# Patient Record
Sex: MALE | URBAN - METROPOLITAN AREA
[De-identification: names, ages, dates, MRNs, and addresses within clinical notes are randomized per-mention and may not be internally consistent; named-entity substitution may affect disease eponyms.]

---

## 2017-07-10 ENCOUNTER — EMERGENCY (EMERGENCY)
Facility: HOSPITAL | Age: 47
LOS: 0 days | Discharge: HOME | End: 2017-07-10
Attending: EMERGENCY MEDICINE
Payer: COMMERCIAL

## 2017-07-10 VITALS
DIASTOLIC BLOOD PRESSURE: 103 MMHG | HEART RATE: 87 BPM | HEIGHT: 71 IN | SYSTOLIC BLOOD PRESSURE: 192 MMHG | TEMPERATURE: 98 F | OXYGEN SATURATION: 100 % | WEIGHT: 179.9 LBS | RESPIRATION RATE: 18 BRPM

## 2017-07-10 PROCEDURE — 70450 CT HEAD/BRAIN W/O DYE: CPT | Mod: 26

## 2017-07-10 PROCEDURE — 99284 EMERGENCY DEPT VISIT MOD MDM: CPT | Mod: 25

## 2017-07-10 PROCEDURE — 12013 RPR F/E/E/N/L/M 2.6-5.0 CM: CPT

## 2017-07-10 PROCEDURE — 72125 CT NECK SPINE W/O DYE: CPT | Mod: 26

## 2017-07-10 PROCEDURE — 76377 3D RENDER W/INTRP POSTPROCES: CPT | Mod: 26

## 2017-07-10 RX ORDER — TETANUS TOXOID, REDUCED DIPHTHERIA TOXOID AND ACELLULAR PERTUSSIS VACCINE, ADSORBED 5; 2.5; 8; 8; 2.5 [IU]/.5ML; [IU]/.5ML; UG/.5ML; UG/.5ML; UG/.5ML
0.5 SUSPENSION INTRAMUSCULAR ONCE
Qty: 0 | Refills: 0 | Status: COMPLETED | OUTPATIENT
Start: 2017-07-10 | End: 2017-07-10

## 2017-07-10 RX ADMIN — TETANUS TOXOID, REDUCED DIPHTHERIA TOXOID AND ACELLULAR PERTUSSIS VACCINE, ADSORBED 0.5 MILLILITER(S): 5; 2.5; 8; 8; 2.5 SUSPENSION INTRAMUSCULAR at 20:05

## 2017-07-10 NOTE — ED PROVIDER NOTE - PHYSICAL EXAMINATION
Gen: Alert, NAD  Head: NC, 4cm laceration right forehead, hemostatic  Eyes: PERRL, EOMI, normal lids/conjunctiva  ENT: normal hearing, patent oropharynx without erythema/exudate, uvula midline  Neck: supple, no tenderness, Trachea midline  Pulm: Bilateral BS, normal resp effort, no wheeze/stridor/retractions  CV: RRR, no M/R/G, 2+ radial and dp pulses bl, no edema  Abd: soft, NT/ND, +BS, no hepatosplenomegaly  Mskel: extremities x4 with normal ROM and no joint effusions. no ctl spine ttp.   Skin: 3 cm vertical laceration right forehead, deep to calvarium  Neuro: AAOx3, no sensory/motor deficits, CN 2-12 intact

## 2017-07-10 NOTE — ED ADULT NURSE NOTE - OBJECTIVE STATEMENT
pt came in with c/o s/p fall from a ladder , with head injury , denies any loc,sustained laceration to right lateral forehead,ct scan done,

## 2017-07-10 NOTE — ED ADULT TRIAGE NOTE - CHIEF COMPLAINT QUOTE
Right sided abdominal pain.  Vomiting since this AM.  no c/o chest pain, dizziness or weakness. S/p fall from the ladder 7 feet high. no Blood thinner. Laceration o the head. Denies any loc

## 2017-07-10 NOTE — ED PROVIDER NOTE - OBJECTIVE STATEMENT
Pertinent PMH/PSH/FHx/SHx and Review of Systems contained within:  47m no med hx pw fall off ladder mechanically. fell approx 6ft, landing on hands, head hit brick. no loc, no ha. took ibuprofen pta. laceration of forehead bleeding a lot. no nasuea, vomting, dizziness, blurred vision   Fh and Sh not otherwise contributory  ROS otherwise negative

## 2017-07-10 NOTE — ED PROVIDER NOTE - MEDICAL DECISION MAKING DETAILS
fall from ladder. laceration to forehead. sutured with good cosmetic and hemostatic effect. tetanus given. ct head and c spine negative for acute process. humberto.

## 2017-07-11 DIAGNOSIS — S01.81XA LACERATION WITHOUT FOREIGN BODY OF OTHER PART OF HEAD, INITIAL ENCOUNTER: ICD-10-CM
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
## 2017-07-17 ENCOUNTER — EMERGENCY (EMERGENCY)
Facility: HOSPITAL | Age: 47
LOS: 0 days | Discharge: HOME | End: 2017-07-17
Attending: EMERGENCY MEDICINE

## 2017-07-17 VITALS
SYSTOLIC BLOOD PRESSURE: 113 MMHG | DIASTOLIC BLOOD PRESSURE: 66 MMHG | HEART RATE: 74 BPM | WEIGHT: 190.04 LBS | HEIGHT: 71 IN | OXYGEN SATURATION: 99 % | TEMPERATURE: 98 F | RESPIRATION RATE: 18 BRPM

## 2017-07-17 NOTE — ED PROVIDER NOTE - ATTENDING CONTRIBUTION TO CARE
H&P by me: 47 year old male request suture removal. H&P by me: 47 year old male request suture removal. PE: right forehead sutures c/d/i. I&P: sutures removal.

## 2017-07-17 NOTE — ED PROVIDER NOTE - OBJECTIVE STATEMENT
47M here for suture removal, placed to right forehead x 1 week ago, no pain or drainage. No redness or warmth. No fever, chills, nausea, vomiting.

## 2017-07-18 DIAGNOSIS — Z48.02 ENCOUNTER FOR REMOVAL OF SUTURES: ICD-10-CM

## 2021-03-02 NOTE — ED ADULT NURSE NOTE - DOES PATIENT HAVE ADVANCE DIRECTIVE
Is This A New Presentation, Or A Follow-Up?: Skin Lesion
Has Your Skin Lesion Been Treated?: not been treated
No